# Patient Record
Sex: FEMALE | Race: WHITE | NOT HISPANIC OR LATINO | Employment: FULL TIME | URBAN - METROPOLITAN AREA
[De-identification: names, ages, dates, MRNs, and addresses within clinical notes are randomized per-mention and may not be internally consistent; named-entity substitution may affect disease eponyms.]

---

## 2022-07-08 ENCOUNTER — MEDICAL CORRESPONDENCE (OUTPATIENT)
Dept: HEALTH INFORMATION MANAGEMENT | Facility: CLINIC | Age: 42
End: 2022-07-08

## 2022-07-11 ENCOUNTER — TRANSCRIBE ORDERS (OUTPATIENT)
Dept: OTHER | Age: 42
End: 2022-07-11

## 2022-07-11 DIAGNOSIS — H20.9 UVEITIS: Primary | ICD-10-CM

## 2022-07-24 ENCOUNTER — HEALTH MAINTENANCE LETTER (OUTPATIENT)
Age: 42
End: 2022-07-24

## 2022-07-25 ENCOUNTER — OFFICE VISIT (OUTPATIENT)
Dept: OPHTHALMOLOGY | Facility: CLINIC | Age: 42
End: 2022-07-25
Payer: COMMERCIAL

## 2022-07-25 DIAGNOSIS — H15.102 EPISCLERITIS OF LEFT EYE: ICD-10-CM

## 2022-07-25 PROBLEM — H66.90 ACUTE OTITIS MEDIA: Status: ACTIVE | Noted: 2022-02-22

## 2022-07-25 PROBLEM — F41.1 GAD (GENERALIZED ANXIETY DISORDER): Status: ACTIVE | Noted: 2022-07-25

## 2022-07-25 PROCEDURE — G0463 HOSPITAL OUTPT CLINIC VISIT: HCPCS

## 2022-07-25 PROCEDURE — 99203 OFFICE O/P NEW LOW 30 MIN: CPT | Performed by: OPHTHALMOLOGY

## 2022-07-25 RX ORDER — CITALOPRAM HYDROBROMIDE 20 MG/1
TABLET ORAL
COMMUNITY
Start: 2021-08-14

## 2022-07-25 RX ORDER — FOLIC ACID/MV,IRON,MIN/LUTEIN 0.4-18-25
1 TABLET ORAL DAILY
COMMUNITY
Start: 2022-07-17

## 2022-07-25 RX ORDER — VALACYCLOVIR HYDROCHLORIDE 500 MG/1
500 TABLET, FILM COATED ORAL DAILY
Qty: 30 TABLET | Refills: 1 | Status: SHIPPED | OUTPATIENT
Start: 2022-07-25 | End: 2022-10-03

## 2022-07-25 RX ORDER — LOTEPREDNOL ETABONATE 5 MG/ML
SUSPENSION/ DROPS OPHTHALMIC
Qty: 5 ML | Refills: 2 | Status: SHIPPED | OUTPATIENT
Start: 2022-07-25 | End: 2022-10-03

## 2022-07-25 ASSESSMENT — CONF VISUAL FIELD
OS_NORMAL: 1
METHOD: COUNTING FINGERS
OD_NORMAL: 1

## 2022-07-25 ASSESSMENT — SLIT LAMP EXAM - LIDS
COMMENTS: NORMAL
COMMENTS: NORMAL

## 2022-07-25 ASSESSMENT — TONOMETRY
OS_IOP_MMHG: 21
OD_IOP_MMHG: 17
IOP_METHOD: TONOPEN
OS_IOP_MMHG: 26
IOP_METHOD: TONOPEN
OD_IOP_MMHG: 26

## 2022-07-25 ASSESSMENT — VISUAL ACUITY
OD_SC: 20/20
METHOD: SNELLEN - LINEAR
OS_SC: 20/20

## 2022-07-25 ASSESSMENT — CUP TO DISC RATIO
OD_RATIO: 0.3
OS_RATIO: 0.3

## 2022-07-25 ASSESSMENT — EXTERNAL EXAM - RIGHT EYE: OD_EXAM: NORMAL

## 2022-07-25 ASSESSMENT — EXTERNAL EXAM - LEFT EYE: OS_EXAM: NORMAL

## 2022-07-25 NOTE — PATIENT INSTRUCTIONS
Recommend additional diagnostic testing: HLA-B27, ESME, RF, ACE, Quantiferon, Syphiils. I'll message you about the results  Start a pill called Valacyclovir 500 mg daily. Take the same time of the day each day with food.   If/when flares occur, try using this steroid drop called Loteprednol (Lotemax) 3x/day for 3 days in the left eye. . If not better after this period of time, call/message us.   Regarding the dietary changes, you could consider reducing meat/dairy, but not necessary

## 2022-07-25 NOTE — LETTER
7/25/2022       RE: Keren Louis  [address]  [facility] [address]     Dear Colleague,    Thank you for referring your patient, Keren Louis, to the [facility] [place] at [facility]. Please see a copy of my visit note below.    Chief Complaint/Presenting Concern: Uveitis evaluation    History of Present Illness:   Keren Louis is a 41 year old patient who presents for evaluation of episcleritis of the left eye.     The history started perhaps a few years ago with episodes once or twice a year but more frequent episodes have occurred over the last few months. Ms. Louis reports this has happened in April 20222 with discomfort and redness on the right side of left eye with some achiness on that side and foreign body sensation. First time things got better. This recurred in July 2022 which was preceded by a cold sore on the lip. Eye redness drops helped, but mostly it took a few days to go away. These episodes have tended to occur around menstrual cycles.The right eye has always been and is currently uninvolved.     Additional Ocular History: No other eye issues    Relevant Past Medical/Family/Social History:  1. Generalized Anxiety on Citalopram  2. History of cold sores. No genital sores  3. History of Chicken pox as a child    Started new job a few months ago (after 1st episode). 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 Went to [place] since first episode. No family history of eye issues.    Has lived in [place] and [place] previously. No known TB exposures. No smoking    Relevant Review of Systems: Cold sore started before this recent episode started. No joint pain, no rashes on the skins.No trouble breathing, no chest pain, no belly pain.  Laboratory Testing: None recently     Current eye related medications: None     Retina/Uveitis Imaging: None today    Assessment:    1. Episcleritis of left eye  No active  inflammation today    Plan/Recommendations:    Discussed findings with patient. There is no active anterior uveitis today no nodules, no thinning, no posterior synechiae to suggest ongoing inflammation. Dilated exam of the left eye is normal without scars.  Given the focal area of redness as described by the patient, with interval resolution and no sequela of such inflammation, this is most likely episcleritis rather than scleritis.  There is a possibility of coincident anterior uveitis (iritis), but it would be unusual for symptoms to resolve over just a few days with iritis.    Given the association with a cold sore recently, we will try a course of oral valtrex as below daily to determine if its use can help to reduce the frequency and severity of episodes affecting the left eye.  We discussed that there are no obvious sequela of viral mediated episcleritis (no nodules) and no corneal scars nor intraocular changes such as iris transillumination defects.  However given the more frequent episodes as well as redness and discomfort during the episodes, we agreed to try the course of oral antiviral daily and steroid drops if/when needed.  We discussed that if this course is successful, there should be less frequent or less severe episodes of the left eye redness.  We also discussed that it is unclear if this oral medication would reduce the frequency of something which is still episodic, but agreed to start the course and reassess in a few months    The relationship to menstrual cycle is unclear, although that there are some hormonal changes which can be associated with a change to the normal physiologic equilibrium.    Eye pressure is normal in each eye on testing    Recommend additional diagnostic testing: HLA-B27, ESME, RF, ACE, Quantiferon, Syphiils. I'll message you about the results    Start a pill called Valacyclovir 500 mg daily. Take the same time of the day each day with food.     If/when flares occur, try  using this steroid drop called Loteprednol (Lotemax) 3x/day for 3 days in the left eye. . If not better after this period of time, call/message us.     Regarding the dietary changes, you could consider reducing meat/dairy, but not necessary    RTC Late Septemer-early October, tonopen, no dilation, no testing    Physician Attestation     Attending Physician Attestation:  Complete documentation of historical and exam elements from today's encounter can be found in the full encounter summary report (not reduplicated in this progress note). I personally obtained the chief complaint(s) and history of present illness. I confirmed and edited as necessary the review of systems, past medical/surgical history, family history, social history, and examination findings as documented by others; and I examined the patient myself. I personally reviewed the relevant tests, images, and reports as documented above. I formulated and edited as necessary the assessment and plan and discussed the findings and management plan with the patient and any family members present at the time of the visit.  Mark Doll M.D., Uveitis and Medical Retina, July 25, 2022     Again, thank you for allowing me to participate in the care of your patient.          Sincerely,    Mark Doll MD  [facility] Dept of Ophthalmology  Uveitis and Medical Retina

## 2022-07-25 NOTE — NURSING NOTE
Chief Complaints and History of Present Illnesses   Patient presents with     Episcleritis Evaluation     Chief Complaint(s) and History of Present Illness(es)     Episcleritis Evaluation     Laterality: left eye    Onset: gradual    Quality: Eye get tired at the end of the day    Associated symptoms: eye pain (when menstruating), redness (only with flares), headache (when menstruating) and photophobia (will increase).  Negative for dryness, tearing, flashes and floaters    Pain scale: 0/10              Comments     Left eye flares up when menstruating, but the flares do not happen with every cycle. Will last from 3-7 days.   Will not usually take any gtts   Mckenzie Nieto COT 2:22 PM July 25, 2022

## 2022-07-25 NOTE — PROGRESS NOTES
Chief Complaint/Presenting Concern: Uveitis evaluation    History of Present Illness:   Keren Louis is a 41 year old patient who presents for evaluation of episcleritis of the left eye.     The history started perhaps a few years ago with episodes once or twice a year but more frequent episodes have occurred over the last few months. Ms. Louis reports this has happened in April 20222 with discomfort and redness on the right side of left eye with some achiness on that side and foreign body sensation. First time things got better. This recurred in July 2022 which was preceded by a cold sore on the lip. Eye redness drops helped, but mostly it took a few days to go away. These episodes have tended to occur around menstrual cycles.The right eye has always been and is currently uninvolved.     Additional Ocular History: No other eye issues    Relevant Past Medical/Family/Social History:  1. Generalized Anxiety on Citalopram  2. History of cold sores. No genital sores  3. History of Chicken pox as a child    Started new job a few months ago (after 1st episode). 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 Went to [place] since first episode. No family history of eye issues.    Has lived in [place] and [place] previously. No known TB exposures. No smoking    Relevant Review of Systems: Cold sore started before this recent episode started. No joint pain, no rashes on the skins.No trouble breathing, no chest pain, no belly pain.    Laboratory Testing: None recently     Current eye related medications: None     Retina/Uveitis Imaging: None today    Assessment:    1. Episcleritis of left eye  No active inflammation today    Plan/Recommendations:    Discussed findings with patient. There is no active anterior uveitis today no nodules, no thinning, no posterior synechiae to suggest ongoing inflammation. Dilated exam of the left eye is normal without scars.  Given the focal area of redness as  described by the patient, with interval resolution and no sequela of such inflammation, this is most likely episcleritis rather than scleritis.  There is a possibility of coincident anterior uveitis (iritis), but it would be unusual for symptoms to resolve over just a few days with iritis.    Given the association with a cold sore recently, we will try a course of oral valtrex as below daily to determine if its use can help to reduce the frequency and severity of episodes affecting the left eye.  We discussed that there are no obvious sequela of viral mediated episcleritis (no nodules) and no corneal scars nor intraocular changes such as iris transillumination defects.  However given the more frequent episodes as well as redness and discomfort during the episodes, we agreed to try the course of oral antiviral daily and steroid drops if/when needed.  We discussed that if this course is successful, there should be less frequent or less severe episodes of the left eye redness.  We also discussed that it is unclear if this oral medication would reduce the frequency of something which is still episodic, but agreed to start the course and reassess in a few months    The relationship to menstrual cycle is unclear, although that there are some hormonal changes which can be associated with a change to the normal physiologic equilibrium.    Eye pressure is normal in each eye on testing    Recommend additional diagnostic testing: HLA-B27, ESME, RF, ACE, Quantiferon, Syphiils. I'll message you about the results    Start a pill called Valacyclovir 500 mg daily. Take the same time of the day each day with food.     If/when flares occur, try using this steroid drop called Loteprednol (Lotemax) 3x/day for 3 days in the left eye. . If not better after this period of time, call/message us.     Regarding the dietary changes, you could consider reducing meat/dairy, but not necessary    RTC Late Septemer-early October, jelani mendoza  dilation, no testing    Physician Attestation     Attending Physician Attestation:  Complete documentation of historical and exam elements from today's encounter can be found in the full encounter summary report (not reduplicated in this progress note). I personally obtained the chief complaint(s) and history of present illness. I confirmed and edited as necessary the review of systems, past medical/surgical history, family history, social history, and examination findings as documented by others; and I examined the patient myself. I personally reviewed the relevant tests, images, and reports as documented above. I formulated and edited as necessary the assessment and plan and discussed the findings and management plan with the patient and any family members present at the time of the visit.  Mark Doll M.D., Uveitis and Medical Retina, July 25, 2022

## 2022-07-29 PROBLEM — H15.102 EPISCLERITIS, LEFT: Status: ACTIVE | Noted: 2022-07-29

## 2022-10-03 ENCOUNTER — OFFICE VISIT (OUTPATIENT)
Dept: OPHTHALMOLOGY | Facility: CLINIC | Age: 42
End: 2022-10-03
Attending: OPHTHALMOLOGY
Payer: COMMERCIAL

## 2022-10-03 ENCOUNTER — HEALTH MAINTENANCE LETTER (OUTPATIENT)
Age: 42
End: 2022-10-03

## 2022-10-03 DIAGNOSIS — H15.102 EPISCLERITIS, LEFT: ICD-10-CM

## 2022-10-03 DIAGNOSIS — Z15.89 HLA B27 (HLA B27 POSITIVE): ICD-10-CM

## 2022-10-03 DIAGNOSIS — H15.102 EPISCLERITIS OF LEFT EYE: Primary | ICD-10-CM

## 2022-10-03 PROCEDURE — 99213 OFFICE O/P EST LOW 20 MIN: CPT | Performed by: OPHTHALMOLOGY

## 2022-10-03 PROCEDURE — G0463 HOSPITAL OUTPT CLINIC VISIT: HCPCS

## 2022-10-03 RX ORDER — VALACYCLOVIR HYDROCHLORIDE 500 MG/1
TABLET, FILM COATED ORAL
Qty: 60 TABLET | Refills: 1 | Status: SHIPPED | OUTPATIENT
Start: 2022-10-03 | End: 2022-12-09

## 2022-10-03 RX ORDER — LOTEPREDNOL ETABONATE 5 MG/ML
SUSPENSION/ DROPS OPHTHALMIC
Qty: 5 ML | Refills: 2 | Status: SHIPPED | OUTPATIENT
Start: 2022-10-03 | End: 2022-12-09

## 2022-10-03 ASSESSMENT — SLIT LAMP EXAM - LIDS
COMMENTS: NORMAL
COMMENTS: NORMAL

## 2022-10-03 ASSESSMENT — VISUAL ACUITY
OD_SC: 20/20
METHOD: SNELLEN - LINEAR
OS_SC: 20/20

## 2022-10-03 ASSESSMENT — TONOMETRY
OS_IOP_MMHG: 28
OS_IOP_MMHG: 18
OD_IOP_MMHG: 20
OD_IOP_MMHG: 27
IOP_METHOD: APPLANATION
IOP_METHOD: TONOPEN

## 2022-10-03 ASSESSMENT — CUP TO DISC RATIO
OD_RATIO: 0.3
OS_RATIO: 0.3

## 2022-10-03 ASSESSMENT — CONF VISUAL FIELD
OD_NORMAL: 1
METHOD: COUNTING FINGERS
OS_NORMAL: 1

## 2022-10-03 ASSESSMENT — EXTERNAL EXAM - RIGHT EYE: OD_EXAM: NORMAL

## 2022-10-03 ASSESSMENT — EXTERNAL EXAM - LEFT EYE: OS_EXAM: NORMAL

## 2022-10-03 NOTE — LETTER
10/3/2022       RE: Keren Louis  [address]  [facility] [address]     Dear Colleague,    Thank you for referring your patient, Keren Louis, to the [facility] [place] at [facility]. Please see a copy of my visit note below.    Chief Complaint/Presenting Concern:  Uveitis follow up    Interval History of Present Ocular Illness:  Keren Louis is a 42 year old patient who returns for follow up of her uveitis of the left eye. At last visit, we discussed adding the oral valacyclovir along with drops. We also recommended lab tests as noted below.     Keren reports that even without the pill over a long weekend of camping that was associated with a flare. She was not able to get the pill but things got better on drops alone even without the Valtrex pill. This flare was not as severe as previously.     Keren reports some other flare symptoms but this did not materialize.     Interval Updates to Medical/Family/Social History:  No other health changes     Relevant Review of Systems Updates:  Did well on Valacyclovir     Labs: Normal/negative: ACE, ESME, RF, RPR,Quantiferon, HLA B27 positive      Current eye related medications: Valtrex 500 mg daily, no Lotemax in one month.     Retina/Uveitis Imaging: None today    Assessment:     1. Episcleritis of left eye  Inactive today.   2. HLA B27 (HLA B27 positive)  Recently identified on lab testing without systemic associations.     Plan/Recommendations:      Discussed findings with patient. The oral valacyclovir has been well tolerated but one flare happened in spite of Valtrex. We discussed continued daily use or just when needed and will try when needed for flares    Eye pressure was elevated initially but normal on recheck.     Recommend additional testing: None at this time    Stop daily Valacyclovir 500 mg daily use. If/when flares occur: Take 500 mg (one pill) three times daily for three days with food. This  will match the frequency of steroid drops (Loteprednol), also use 3x/day for 3 days. If things are not better, let us know    RTC January 2023, applanate, no dilation, no testing    Physician Attestation     Attending Physician Attestation:  Complete documentation of historical and exam elements from today's encounter can be found in the full encounter summary report (not reduplicated in this progress note). I personally obtained the chief complaint(s) and history of present illness. I confirmed and edited as necessary the review of systems, past medical/surgical history, family history, social history, and examination findings as documented by others; and I examined the patient myself. I personally reviewed the relevant tests, images, and reports as documented above. I formulated and edited as necessary the assessment and plan and discussed the findings and management plan with the patient and family members present at the time of this visit.  Mark Doll M.D., Uveitis and Medical Retina, October 3, 2022     Again, thank you for allowing me to participate in the care of your patient.      Sincerely,    Mark Doll MD  [facility] Dept of Ophthalmology  Uveitis and Medical Retina

## 2022-10-03 NOTE — NURSING NOTE
Chief Complaints and History of Present Illnesses   Patient presents with     Episcleritis/scleritis Follow Up     Chief Complaint(s) and History of Present Illness(es)     Episcleritis/scleritis Follow Up     Laterality: left eye    Onset: gradual    Onset: months ago    Quality: States va is the same since last visit      Severity: moderate    Frequency: intermittently    Associated symptoms: Negative for dryness, redness, tearing, photophobia, flashes and floaters    Treatments tried: no treatments    Pain scale: 0/10              Comments     Here for Episcleritis of left eye  Valacyclovir  Mckenzie Nieto COT 2:14 PM October 3, 2022

## 2022-10-03 NOTE — PATIENT INSTRUCTIONS
Stop daily Valacyclovir 500 mg daily use. If/when flares occur: Take 500 mg (one pill) three times daily for three days with food. This will match the frequency of steroid drops (Loteprednol), also use 3x/day for 3 days. If things are not better, let us know

## 2022-10-03 NOTE — PROGRESS NOTES
Chief Complaint/Presenting Concern:  Uveitis follow up    Interval History of Present Ocular Illness:  Keren Louis is a 42 year old patient who returns for follow up of her uveitis of the left eye. At last visit, we discussed adding the oral valacyclovir along with drops. We also recommended lab tests as noted below.     Keren reports that even without the pill over a long weekend of camping that was associated with a flare. She was not able to get the pill but things got better on drops alone even without the Valtrex pill. This flare was not as severe as previously.     Keren reports some other flare symptoms but this did not materialize.     Interval Updates to Medical/Family/Social History:  No other health changes     Relevant Review of Systems Updates:  Did well on Valacyclovir     Labs: Normal/negative: ACE, ESME, RF, RPR,Quantiferon, HLA B27 positive      Current eye related medications: Valtrex 500 mg daily, no Lotemax in one month.     Retina/Uveitis Imaging: None today    Assessment:     1. Episcleritis of left eye  Inactive today.     2. HLA B27 (HLA B27 positive)  Recently identified on lab testing without systemic associations.     Plan/Recommendations:      Discussed findings with patient. The oral valacyclovir has been well tolerated but one flare happened in spite of Valtrex. We discussed continued daily use or just when needed and will try when needed for flares    Eye pressure was elevated initially but normal on recheck.     Recommend additional testing: None at this time    Stop daily Valacyclovir 500 mg daily use. If/when flares occur: Take 500 mg (one pill) three times daily for three days with food. This will match the frequency of steroid drops (Loteprednol), also use 3x/day for 3 days. If things are not better, let us know    [facility] January 2023, applanate, no dilation, no testing    Physician Attestation     Attending Physician Attestation:  Complete documentation of historical and exam elements  from today's encounter can be found in the full encounter summary report (not reduplicated in this progress note). I personally obtained the chief complaint(s) and history of present illness. I confirmed and edited as necessary the review of systems, past medical/surgical history, family history, social history, and examination findings as documented by others; and I examined the patient myself. I personally reviewed the relevant tests, images, and reports as documented above. I formulated and edited as necessary the assessment and plan and discussed the findings and management plan with the patient and family members present at the time of this visit.  Mark Doll M.D., Uveitis and Medical Retina, October 3, 2022

## 2022-12-09 ENCOUNTER — OFFICE VISIT (OUTPATIENT)
Dept: OPHTHALMOLOGY | Facility: CLINIC | Age: 42
End: 2022-12-09
Attending: OPHTHALMOLOGY
Payer: COMMERCIAL

## 2022-12-09 DIAGNOSIS — H15.102 EPISCLERITIS/SCLERITIS, LEFT: Primary | ICD-10-CM

## 2022-12-09 DIAGNOSIS — H15.002 EPISCLERITIS/SCLERITIS, LEFT: Primary | ICD-10-CM

## 2022-12-09 PROCEDURE — 99213 OFFICE O/P EST LOW 20 MIN: CPT | Performed by: OPHTHALMOLOGY

## 2022-12-09 PROCEDURE — G0463 HOSPITAL OUTPT CLINIC VISIT: HCPCS | Mod: 25

## 2022-12-09 RX ORDER — VALACYCLOVIR HYDROCHLORIDE 500 MG/1
TABLET, FILM COATED ORAL
Qty: 60 TABLET | Refills: 1 | Status: SHIPPED | OUTPATIENT
Start: 2022-12-09 | End: 2023-01-09

## 2022-12-09 RX ORDER — LOTEPREDNOL ETABONATE 5 MG/ML
SUSPENSION/ DROPS OPHTHALMIC
Qty: 5 ML | Refills: 3 | Status: SHIPPED | OUTPATIENT
Start: 2022-12-09

## 2022-12-09 ASSESSMENT — CONF VISUAL FIELD
OD_SUPERIOR_TEMPORAL_RESTRICTION: 0
OS_SUPERIOR_TEMPORAL_RESTRICTION: 0
OS_NORMAL: 1
OD_INFERIOR_TEMPORAL_RESTRICTION: 0
OD_NORMAL: 1
OS_INFERIOR_TEMPORAL_RESTRICTION: 0
OS_INFERIOR_NASAL_RESTRICTION: 0
METHOD: COUNTING FINGERS
OD_SUPERIOR_NASAL_RESTRICTION: 0
OS_SUPERIOR_NASAL_RESTRICTION: 0
OD_INFERIOR_NASAL_RESTRICTION: 0

## 2022-12-09 ASSESSMENT — EXTERNAL EXAM - RIGHT EYE: OD_EXAM: NORMAL

## 2022-12-09 ASSESSMENT — VISUAL ACUITY
METHOD: SNELLEN - LINEAR
OS_SC: 20/20
OD_SC: 20/20

## 2022-12-09 ASSESSMENT — TONOMETRY
OS_IOP_MMHG: 26
OD_IOP_MMHG: 27
IOP_METHOD: TONOPEN

## 2022-12-09 ASSESSMENT — SLIT LAMP EXAM - LIDS
COMMENTS: NORMAL
COMMENTS: NORMAL

## 2022-12-09 ASSESSMENT — EXTERNAL EXAM - LEFT EYE: OS_EXAM: NORMAL

## 2022-12-09 ASSESSMENT — CUP TO DISC RATIO
OD_RATIO: 0.3
OS_RATIO: 0.3

## 2022-12-09 NOTE — LETTER
12/9/2022       RE: Keren Louis  [address]  [facility] [address]     Dear Colleague,    Thank you for referring your patient, Keren Louis, to the [facility] [place] at [facility]. Please see a copy of my visit note below.    Chief Complaint/Presenting Concern:  Uveitis urgent visit.     Interval History of Present Ocular Illness:  Keren Louis is a 42 year old patient who returns for follow up of her episcleritis of the left eye. At last visit, we discussed flares.    Interval Updates to Medical/Family/Social History:    A bad headaches preceded an ear infection left side. Headache did not get better with ibuprofen. Keren went to Urgent care and was diagnosed and started oral amoxicillin and decongestants.  This has gradually improved but still not hearing on that side.     Relevant Review of Systems Updates:  Still having hearing issues and sinus pressure left side. No pain when moving ear lobe. No red spots on skin. There was significant fatigue earlier in the week now better.     Current eye related medications: Loteprednol 3x/day left eye, Valacyclovir 500 mg three times daily (both of these the last few days), completed oral amoxicillin.    Retina/Uveitis Imaging: None today.    Assessment:     1. Episcleritis/scleritis of left eye  Episcleral with some deep scleral inflammation, no nodules, no cornea changes, no skin changes.     Plan/Recommendations:      Discussed findings with patient. There is some improvement but subjectively but there is some focal scleritis. Things are better with drops and Valtrex pills, but still not resolved. Also, hearing/sinus issues on left side have not resolved. We will modify as below.     Eye pressure is slightly elevated but optic nerves healthy.    Recommend additional testing: ANCA when getting labs next to check for any sign of ANCA vasculitis which can sometimes cause scleritis and sinus/ear  issues.    Continue the oral Valacyclovir 500 mg three times daily until Sunday, 12/18/22. Then if things are totally fine, you can stop completely. If better but still not feeling totally, then you can take one pill daily as maintenance even until next visit.    For the eye drop left eye, continue 3x/day until 12/18/22. If things are doing well then reduce 2x/day until 12/25, then daily until 1/1/23, then stop.     Regarding the hearing issues on the left side, recommend following up with PCP.  If they agree, and eye not better, we can consider a short course of oral steroid called medrol dose pack. We would ask them to consider any oral antibiotic if needed before/during this course.    RTC As scheduled January 2023    Physician Attestation     Attending Physician Attestation:  Complete documentation of historical and exam elements from today's encounter can be found in the full encounter summary report (not reduplicated in this progress note). I personally obtained the chief complaint(s) and history of present illness. I confirmed and edited as necessary the review of systems, past medical/surgical history, family history, social history, and examination findings as documented by others; and I examined the patient myself. I personally reviewed the relevant tests, images, and reports as documented above. I formulated and edited as necessary the assessment and plan and discussed the findings and management plan with the patient and family members present at the time of this visit.  Mark Doll M.D., Uveitis and Medical Retina, December 9, 2022         Again, thank you for allowing me to participate in the care of your patient.      Sincerely,    Mark Doll MD  [facility] Dept of Ophthalmology  Uveitis and Medical Retina

## 2022-12-09 NOTE — NURSING NOTE
Chief Complaints and History of Present Illnesses   Patient presents with     Uveitis Follow-Up     Chief Complaint(s) and History of Present Illness(es)     Uveitis Follow-Up            Laterality: left eye    Severity: moderate    Frequency: intermittently    Pain scale: 2/10          Comments    Patient states that she has had severe inflammation in her LE. She states that it started when she got sick with an ear infection in the Left ear. She states that she went through a round of antibiotics and still can not hear well. She states that she has pain, redness and light sensitivity in the LE. She states that the pain has gotten better, she states that it was an 8/10. She states that she has a lot of pressure going from her LE to her left eye. She states that it started with a headache that she could not get rid of.     Ocular Meds:Loteprednol TID in the LE   Valacyclovir 500 mg TID       Qasim Timmyjody MOFFETT, December 9, 2022 10:54 AM

## 2022-12-09 NOTE — PROGRESS NOTES
Chief Complaint/Presenting Concern:  Uveitis urgent visit.     Interval History of Present Ocular Illness:  Keren Louis is a 42 year old patient who returns for follow up of her episcleritis of the left eye. At last visit, we discussed flares     Interval Updates to Medical/Family/Social History:    A bad headaches preceded an ear infection left side. Headache did not get better with ibuprofen. Keren went to Urgent care and was diagnosed and started oral amoxicillin and decongestants.  This has gradually improved but still not hearing on that side.     Relevant Review of Systems Updates:  Still having hearing issues and sinus pressure left side. No pain when moving ear lobe. No red spots on skin. There was significant fatigue earlier in the week now better.     Current eye related medications: Loteprednol 3x/day left eye, Valacyclovir 500 mg three times daily (both of these the last few days), completed oral amoxicillin    Retina/Uveitis Imaging: None today    Assessment:     1. Episcleritis/scleritis of left eye  Episcleral with some deep scleral inflammation, no nodules, no cornea changes, no skin changes.     Plan/Recommendations:      Discussed findings with patient. There is some improvement but subjectively but there is some focal scleritis. Things are better with drops and Valtrex pills, but still not resolved. Also, hearing/sinus issues on left side have not resolved. We will modify as below.     Eye pressure is slightly elevated but optic nerves healthy    Recommend additional testing: ANCA when getting labs next to check for any sign of ANCA vasculitis which can sometimes cause scleritis and sinus/ear issues    Continue the oral Valacyclovir 500 mg three times daily until Sunday, 12/18/22. Then if things are totally fine, you can stop completely. If better but still not feeling totally, then you can take one pill daily as maintenance even until next visit    For the eye drop left eye, continue 3x/day until  12/18/22. If things are doing well then reduce 2x/day until 12/25, then daily until 1/1/23, then stop.     Regarding the hearing issues on the left side, recommend following up with PCP.  If they agree, and eye not better, we can consider a short course of oral steroid called medrol dose pack. We would ask them to consider any oral antibiotic if needed before/during this course    RTC As scheduled January 2023    Physician Attestation     Attending Physician Attestation:  Complete documentation of historical and exam elements from today's encounter can be found in the full encounter summary report (not reduplicated in this progress note). I personally obtained the chief complaint(s) and history of present illness. I confirmed and edited as necessary the review of systems, past medical/surgical history, family history, social history, and examination findings as documented by others; and I examined the patient myself. I personally reviewed the relevant tests, images, and reports as documented above. I formulated and edited as necessary the assessment and plan and discussed the findings and management plan with the patient and family members present at the time of this visit.  Mark Doll M.D., Uveitis and Medical Retina, December 9, 2022

## 2022-12-09 NOTE — PATIENT INSTRUCTIONS
Continue the oral Valacyclovir 500 mg three times daily until Sunday, 12/18/22. Then if things are totally fine, you can stop completely. If better but still not feeling totally, then you can take one pill daily as maintenance even until next visit  For the eye drop left eye, continue 3x/day until 12/18/22. If things are doing well then reduce 2x/day until 12/25, then daily until 1/1/23, then stop.   Regarding the hearing issues on the left side, recommend following up with PCP.  If they agree, and eye not better, we can consider a short course of oral steroid called medrol dose pack. We would ask them to consider any oral antibiotic if needed before/during this course

## 2023-01-09 ENCOUNTER — OFFICE VISIT (OUTPATIENT)
Dept: OPHTHALMOLOGY | Facility: CLINIC | Age: 43
End: 2023-01-09
Attending: OPHTHALMOLOGY
Payer: COMMERCIAL

## 2023-01-09 DIAGNOSIS — H15.102 EPISCLERITIS OF LEFT EYE: Primary | ICD-10-CM

## 2023-01-09 DIAGNOSIS — Z15.89 HLA B27 (HLA B27 POSITIVE): ICD-10-CM

## 2023-01-09 PROCEDURE — G0463 HOSPITAL OUTPT CLINIC VISIT: HCPCS

## 2023-01-09 PROCEDURE — 99213 OFFICE O/P EST LOW 20 MIN: CPT | Performed by: OPHTHALMOLOGY

## 2023-01-09 RX ORDER — VALACYCLOVIR HYDROCHLORIDE 500 MG/1
500 TABLET, FILM COATED ORAL DAILY
Qty: 90 TABLET | Refills: 1 | Status: SHIPPED | OUTPATIENT
Start: 2023-01-09

## 2023-01-09 ASSESSMENT — CONF VISUAL FIELD
OS_SUPERIOR_NASAL_RESTRICTION: 0
METHOD: COUNTING FINGERS
OD_SUPERIOR_NASAL_RESTRICTION: 0
OD_INFERIOR_TEMPORAL_RESTRICTION: 0
OD_SUPERIOR_TEMPORAL_RESTRICTION: 0
OS_SUPERIOR_TEMPORAL_RESTRICTION: 0
OD_NORMAL: 1
OS_INFERIOR_TEMPORAL_RESTRICTION: 0
OD_INFERIOR_NASAL_RESTRICTION: 0
OS_INFERIOR_NASAL_RESTRICTION: 0
OS_NORMAL: 1

## 2023-01-09 ASSESSMENT — EXTERNAL EXAM - RIGHT EYE: OD_EXAM: NORMAL

## 2023-01-09 ASSESSMENT — SLIT LAMP EXAM - LIDS
COMMENTS: NORMAL
COMMENTS: NORMAL

## 2023-01-09 ASSESSMENT — VISUAL ACUITY
OS_SC: 20/20
OD_SC: 20/20
METHOD: SNELLEN - LINEAR

## 2023-01-09 ASSESSMENT — TONOMETRY
OD_IOP_MMHG: 19
IOP_METHOD: APPLANATION
OS_IOP_MMHG: 20

## 2023-01-09 ASSESSMENT — CUP TO DISC RATIO
OD_RATIO: 0.3
OS_RATIO: 0.3

## 2023-01-09 ASSESSMENT — EXTERNAL EXAM - LEFT EYE: OS_EXAM: NORMAL

## 2023-01-09 NOTE — LETTER
1/9/2023       RE: Keren Louis  [address]  [facility] [address]     Dear Colleague,    Thank you for referring your patient, Keren Louis, to the [facility] [place] at [facility]. Please see a copy of my visit note below.    Chief Complaint/Presenting Concern:  Uveitis follow up    Interval History of Present Ocular Illness:  Keren Louis is a 42 year old patient who returns for follow up of her episcleritis of the left eye. At last visit in early December, we tapered drops in the left eye and discussed possible use of continued Valtrex daily.    Keren reports things have done well off drops and continued Valtrex daily.    Interval Updates to Medical/Family/Social History:    We discussed the possibility of citalopram related to the eye inflammation. This will be discussed with [facility] in two days.    Relevant Review of Systems Updates:  Mood doing well. Small cold sore currently.    Labs: 12/2022: ANCA negative    Current eye related medications: Valtrex 500 mg daily, no drops left eye in few weeks    No imaging today    Assessment:     1. Episcleritis of left eye  Inactive today      2. HLA B27 (HLA B27 positive)  Without systemic associations.     Plan/Recommendations:      Discussed findings with patient. There is no active episcleritis left eye and the right eye remains uninvolved. We will modify plans as below.     Eye pressure is normal in each eye     Recommend additional testing: None at this time.    Continue Valtrex 500 mg daily for protection against flares    No need for steroid drops now, but can use 3x/day for 3 days for flares    Reasonable to consider stopping/changing Citaprolam if okay with your Primary Team to see if stopping this might help reduce flare symptoms/frequency. This medication has done well for mood symptoms and we discussed that medication side effects often affect both eyes. It could be  tapered/transitioned off to see if this helps reduce risk of eye symptoms. If eye doing well off Citalopram for one month, could consider stopping Valtrex roughly one month alter.. IF it is determined that Citalopram should be continued or an alternative might be tried, this is reasonable.    RTC TBD, likely 3-4 months or roughly one month off daily Valtrex. Keren will call/message    Physician Attestation     Attending Physician Attestation:  Complete documentation of historical and exam elements from today's encounter can be found in the full encounter summary report (not reduplicated in this progress note). I personally obtained the chief complaint(s) and history of present illness. I confirmed and edited as necessary the review of systems, past medical/surgical history, family history, social history, and examination findings as documented by others; and I examined the patient myself. I personally reviewed the relevant tests, images, and reports as documented above. I formulated and edited as necessary the assessment and plan and discussed the findings and management plan with the patient and family members present at the time of this visit.  Mark Doll M.D., Uveitis and Medical Retina, January 9, 2023     Again, thank you for allowing me to participate in the care of your patient.      Sincerely,    Mark Doll MD  [facility] Dept of Ophthalmology  Uveitis and Medical Retina

## 2023-01-09 NOTE — PROGRESS NOTES
Chief Complaint/Presenting Concern:  Uveitis follow up    Interval History of Present Ocular Illness:  Keren Louis is a 42 year old patient who returns for follow up of her episcleritis of the left eye. At last visit in early December, we tapered drops in the left eye and discussed possible use of continued Valtrex daily.    Keren reports things have done well off drops and continued Valtrex daily.    Interval Updates to Medical/Family/Social History:    We discussed the possibility of citalopram related to the eye inflammation. This will be discussed with [facility] in two days.    Relevant Review of Systems Updates:  Mood doing well. Small cold sore currently.    Labs: 12/2022: ANCA negative    Current eye related medications: Valtrex 500 mg daily, no drops left eye in few weeks    No imaging today    Assessment:     1. Episcleritis of left eye  Inactive today    2. HLA B27 (HLA B27 positive)  Without systemic associations.     Plan/Recommendations:      Discussed findings with patient. There is no active episcleritis left eye and the right eye remains uninvolved. We will modify plans as below.     Eye pressure is normal in each eye     Recommend additional testing: None at this time.    Continue Valtrex 500 mg daily for protection against flares    No need for steroid drops now, but can use 3x/day for 3 days for flares    Reasonable to consider stopping/changing Citaprolam if okay with your Primary Team to see if stopping this might help reduce flare symptoms/frequency. This medication has done well for mood symptoms and we discussed that medication side effects often affect both eyes. It could be tapered/transitioned off to see if this helps reduce risk of eye symptoms. If eye doing well off Citalopram for one month, could consider stopping Valtrex roughly one month alter.. IF it is determined that Citalopram should be continued or an alternative might be tried, this is reasonable.    RTC TBD, likely 3-4  months or roughly one month off daily Valtrex. Keren will call/message    Physician Attestation     Attending Physician Attestation:  Complete documentation of historical and exam elements from today's encounter can be found in the full encounter summary report (not reduplicated in this progress note). I personally obtained the chief complaint(s) and history of present illness. I confirmed and edited as necessary the review of systems, past medical/surgical history, family history, social history, and examination findings as documented by others; and I examined the patient myself. I personally reviewed the relevant tests, images, and reports as documented above. I formulated and edited as necessary the assessment and plan and discussed the findings and management plan with the patient and family members present at the time of this visit.  Mark Doll M.D., Uveitis and Medical Retina, January 9, 2023

## 2023-01-09 NOTE — NURSING NOTE
Chief Complaints and History of Present Illnesses   Patient presents with     Scleritis Follow Up     Chief Complaint(s) and History of Present Illness(es)     Scleritis Follow Up            Laterality: left eye    Onset: gradual    Onset: months ago    Quality: States the va is doing great     Severity: moderate    Frequency: intermittently    Associated symptoms: photophobia (not new).  Negative for dryness, redness, tearing, flashes and floaters    Treatments tried: no treatments    Pain scale: 0/10          Comments    Here for Episcleritis/scleritis, left  States no flares since the last flare  Valtrex daily  Mckenzie Nieto COT 9:57 AM January 9, 2023

## 2023-08-13 ENCOUNTER — HEALTH MAINTENANCE LETTER (OUTPATIENT)
Age: 43
End: 2023-08-13

## 2024-10-06 ENCOUNTER — HEALTH MAINTENANCE LETTER (OUTPATIENT)
Age: 44
End: 2024-10-06